# Patient Record
Sex: MALE | Race: BLACK OR AFRICAN AMERICAN | URBAN - METROPOLITAN AREA
[De-identification: names, ages, dates, MRNs, and addresses within clinical notes are randomized per-mention and may not be internally consistent; named-entity substitution may affect disease eponyms.]

---

## 2018-10-16 ENCOUNTER — OFFICE VISIT (OUTPATIENT)
Dept: ORTHOPEDIC SURGERY | Age: 58
End: 2018-10-16
Payer: COMMERCIAL

## 2018-10-16 VITALS
HEIGHT: 68 IN | WEIGHT: 183 LBS | BODY MASS INDEX: 27.74 KG/M2 | DIASTOLIC BLOOD PRESSURE: 89 MMHG | HEART RATE: 66 BPM | SYSTOLIC BLOOD PRESSURE: 150 MMHG

## 2018-10-16 DIAGNOSIS — M25.562 LEFT KNEE PAIN, UNSPECIFIED CHRONICITY: ICD-10-CM

## 2018-10-16 DIAGNOSIS — S86.112A STRAIN OF GASTROCNEMIUS MUSCLE OF LEFT LOWER EXTREMITY, INITIAL ENCOUNTER: Primary | ICD-10-CM

## 2018-10-16 PROCEDURE — L3170 FOOT PLAS HEEL STABI PRE OTS: HCPCS | Performed by: ORTHOPAEDIC SURGERY

## 2018-10-16 PROCEDURE — 99204 OFFICE O/P NEW MOD 45 MIN: CPT | Performed by: ORTHOPAEDIC SURGERY

## 2018-10-16 NOTE — PROGRESS NOTES
Date:  10/16/2018    Name:  Lisa Aiken  Address:  [address] [place] [address]    :  1960      Age:   59 y.o.    SSN:  [id number]      Medical Record Number:  [id number]    Reason for Visit:    Chief Complaint    Knee Pain (np left knee pain.)      DOS:10/16/2018     HPI: Ashley Castro is a 59 y.o. male here today for evaluation of left knee. About 3-4 weeks ago was running on treadmill resulting in pain then on  he felt a pop in left knee. He reports in addition to the pain he's had swelling. Denies any locking or catching. Treatment has included ibuprofen. A long time ago injured his left knee suspecting an MCL sprain. Pain Assessment  Location of Pain: Knee  Location Modifiers: Left  Severity of Pain: 3  Quality of Pain: Dull  Duration of Pain: Persistent  Frequency of Pain: Intermittent  Aggravating Factors: Walking  Limiting Behavior: Yes  Relieving Factors: Rest  Result of Injury: Yes  Work-Related Injury: No  Are there other pain locations you wish to document?: No  ROS: All systems reviewed on patient intake form. Pertinent items are noted in HPI. No past medical history on file. Past Surgical History:   Procedure Laterality Date    FINGER SURGERY         No family history on file. Social History     Social History    Marital status:      Spouse name: N/A    Number of children: N/A    Years of education: N/A     Social History Main Topics    Smoking status: Never Smoker    Smokeless tobacco: Never Used    Alcohol use Yes    Drug use: No    Sexual activity: Not Asked     Other Topics Concern    None     Social History Narrative    None       No current outpatient prescriptions on file. No current facility-administered medications for this visit. No Known Allergies    Vital signs:  BP (!) 150/89   Pulse 66   Ht 5' 8\" (1.727 m)   Wt 183 lb (83 kg)   BMI 27.83 kg/m²        Neuro: Alert & oriented x 3,  normal,  no focal deficits noted. Normal affect. Eyes: sclera clear  Ears: Normal external ear  Mouth:  No perioral lesions  Pulm: Respirations unlabored and regular  Pulse: Regular rate    Skin: Warm, well perfused        LEFT Knee Examination:    Gait: No use of assistive devices. No antalgic gait. Alignment: Alignment appreciated. Inspection/skin: Quadriceps well developed. Skin is intact without erythema or ecchymosis. No gross deformity. Palpation: Minimal crepitus. Lateral gastrocnemius tenderness. Nontender along medial or lateral joint line. No pain with compression of patella. Nontender to light touch. Range of Motion: Full ROM. Strength: 5/5 quad strength    Effusion: No apparent effusion. Ligamentous stability: Mild instability with valgus stress without pain, 2+ medial joint opening without tenderness. Stable to varus stress at 0° and 30°. Solid endpoint with Lachman's. Negative posterior and anterior drawer signs. Patella tracking: Smooth translation of patella. Special tests: Positive Anh sign. Patella apprehension sign negative. Neurologic and vascular: Skin is warm and well-perfused. Distally neurovascularly intact. Additional findings: Pain with calf raises. Sensation is intact to light-touch. No pretibial edema. RIGHT comparison knee exam:    Gait: No use of assistive devices. No antalgic gait. Alignment: Alignment appreciated. Inspection/skin: Quadriceps well developed. Skin is intact without erythema or ecchymosis. No gross deformity. Palpation: No gastrocnemius tenderness. No crepitus. Nontender along joint line. No pain with compression of patella. Nontender to light touch. Range of Motion: Full ROM. Strength: 5/5 quad strength    Effusion: No apparent effusion. Ligamentous stability: Stable to valgus and varus stress at 0° and 30°. Solid endpoint with Lachman's. Negative posterior and anterior drawer signs. Patella tracking: Smooth translation of patella.

## 2018-10-23 ENCOUNTER — OFFICE VISIT (OUTPATIENT)
Dept: ORTHOPEDIC SURGERY | Age: 58
End: 2018-10-23
Payer: COMMERCIAL

## 2018-10-23 VITALS
SYSTOLIC BLOOD PRESSURE: 161 MMHG | HEIGHT: 68 IN | WEIGHT: 182.98 LBS | BODY MASS INDEX: 27.73 KG/M2 | DIASTOLIC BLOOD PRESSURE: 91 MMHG | HEART RATE: 74 BPM

## 2018-10-23 DIAGNOSIS — S83.242A TEAR OF MEDIAL MENISCUS OF LEFT KNEE, CURRENT, UNSPECIFIED TEAR TYPE, INITIAL ENCOUNTER: Primary | ICD-10-CM

## 2018-10-23 PROCEDURE — 99214 OFFICE O/P EST MOD 30 MIN: CPT | Performed by: ORTHOPAEDIC SURGERY

## 2018-10-23 NOTE — PROGRESS NOTES
Date:  10/23/2018    Name:  Salbador Morales  Address:  [address] [place] [address]    :  1960      Age:   59 y.o.    SSN:  [id number]      Medical Record Number:  [id number]    Reason for Visit:    Chief Complaint    Results (Left Knee MRI results)      DOS:10/23/2018     HPI: Héctor Dugan is a 59 y.o. male here today for repeat evaluation and MRI review of his left knee. About 3-4 weeks ago was running on treadmill resulting in pain and then after he felt a pop in left knee. Treatment has included ibuprofen and rest. A long time ago injured his left knee suspecting an MCL sprain. Since his last visit he has not had significant increase in his symptoms, in fact his pain has much improved. He denies any popping, locking, or catching sensations at this time. Pain Assessment  Location of Pain: Knee  Location Modifiers: Left  Severity of Pain: 3  Quality of Pain: Dull  Duration of Pain: Persistent  Frequency of Pain: Intermittent  Aggravating Factors: Walking  Limiting Behavior: Yes  Relieving Factors: Rest  Result of Injury: Yes  Work-Related Injury: No  Are there other pain locations you wish to document?: No  ROS: All systems reviewed on patient intake form. Pertinent items are noted in HPI. History reviewed. No pertinent past medical history. Past Surgical History:   Procedure Laterality Date    FINGER SURGERY         History reviewed. No pertinent family history. Social History     Social History    Marital status:      Spouse name: N/A    Number of children: N/A    Years of education: N/A     Social History Main Topics    Smoking status: Never Smoker    Smokeless tobacco: Never Used    Alcohol use Yes    Drug use: No    Sexual activity: Not Asked     Other Topics Concern    None     Social History Narrative    None       No current outpatient prescriptions on file. No current facility-administered medications for this visit.         No Known stress at 0° and 30°. Solid endpoint with Lachman's. Negative posterior and anterior drawer signs. Patella tracking: Smooth translation of patella. Special tests: Negative Anh sign. Patella apprehension sign negative. Neurologic and vascular: Skin is warm and well-perfused. Distally neurovascularly intact. Additional findings: Calf soft nontender. Sensation is intact to light-touch. No pretibial edema. Diagnostics:  Radiology:     Radiographs obtained prior were again reviewed in the office; 4 views: bilateral PA, bilateral [place], bilateral [place] AND LEFT lateral    Impression: mild medial joint space and patellofemoral narrowing. MRI reviewed:   1. 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  A chronic flap tear in the body and posterior horn medial meniscus. 2. A bipartite patella. Assessment:   63yo male with left medial meniscus tear and chondromalacia of the medial femoral condyle    Plan: Mr. Yves Goodrich is a very pleasant 61-year-old male who is seen back today for his left knee and his MRI reviewed. He does have a medial meniscus tear of the posterior horn And also has chondromalacia of the medial femoral condyle. At this time his symptoms have much improved. We would like for him to continue physical therapy and exercises. He should stay on an anti-inflammatory. We counseled him extensively on activity avoidance including avoiding deep squatting, running, and twisting activities. If he does become painful in the future then we could consider a steroid injection for his knee if he developed further mechanical symptoms then obviously we could consider arthroscopic intervention for medial meniscectomy. At this point we would like to see him back on an as-needed basis. Marquis Vincent is in agreement with this plan.  All questions were answered to patient's satisfaction and was encouraged to call with any

## 2018-10-24 NOTE — PROGRESS NOTES
Allergies    Vital signs:  BP (!) 161/91   Pulse 74   Ht 5' 7.99\" (1.727 m)   Wt 182 lb 15.7 oz (83 kg)   BMI 27.83 kg/m²        Neuro: Alert & oriented x 3,  normal,  no focal deficits noted. Normal affect. Eyes: sclera clear  Ears: Normal external ear  Mouth:  No perioral lesions  Pulm: Respirations unlabored and regular  Pulse: Regular rate    Skin: Warm, well perfused        LEFT Knee Examination:    Gait: No use of assistive devices. No antalgic gait. Alignment: Neutral Alignment appreciated. Inspection/skin: Quadriceps well developed. Skin is intact without erythema or ecchymosis. No gross deformity. Palpation: Minimal PF crepitus. Nontender along medial or lateral joint line. No pain with compression of patella. Nontender to light touch. Range of Motion: Full ROM. Strength: 5/5 quad strength    Effusion: No apparent effusion. Ligamentous stability: Mild instability with valgus stress without pain, 2+ medial joint opening without tenderness. Stable to varus stress at 0° and 30°. Solid endpoint with Lachman's. Negative posterior and anterior drawer signs. Patella tracking: Smooth translation of patella. Special tests: Negative Anh sign. Patella apprehension sign negative. Neurologic and vascular: Skin is warm and well-perfused. Distally neurovascularly intact. Additional findings: Sensation is intact to light-touch. No pretibial edema. RIGHT comparison knee exam:    Gait: No use of assistive devices. No antalgic gait. Alignment: Alignment appreciated. Inspection/skin: Quadriceps well developed. Skin is intact without erythema or ecchymosis. No gross deformity. Palpation: No gastrocnemius tenderness. No crepitus. Nontender along joint line. No pain with compression of patella. Nontender to light touch. Range of Motion: Full ROM. Strength: 5/5 quad strength    Effusion: No apparent effusion.     Ligamentous stability: Stable to valgus and varus